# Patient Record
Sex: FEMALE | Race: BLACK OR AFRICAN AMERICAN | URBAN - METROPOLITAN AREA
[De-identification: names, ages, dates, MRNs, and addresses within clinical notes are randomized per-mention and may not be internally consistent; named-entity substitution may affect disease eponyms.]

---

## 2017-12-01 ENCOUNTER — EMERGENCY (EMERGENCY)
Facility: HOSPITAL | Age: 52
LOS: 1 days | Discharge: HOME | End: 2017-12-01
Attending: EMERGENCY MEDICINE | Admitting: EMERGENCY MEDICINE
Payer: SELF-PAY

## 2017-12-01 VITALS
HEART RATE: 72 BPM | TEMPERATURE: 98 F | RESPIRATION RATE: 16 BRPM | DIASTOLIC BLOOD PRESSURE: 81 MMHG | SYSTOLIC BLOOD PRESSURE: 125 MMHG | OXYGEN SATURATION: 100 %

## 2017-12-01 LAB
BASOPHILS # BLD AUTO: 0.01 K/UL — SIGNIFICANT CHANGE UP (ref 0–0.2)
BASOPHILS NFR BLD AUTO: 0.2 % — SIGNIFICANT CHANGE UP (ref 0–2)
EOSINOPHIL # BLD AUTO: 0.04 K/UL — SIGNIFICANT CHANGE UP (ref 0–0.5)
EOSINOPHIL NFR BLD AUTO: 0.9 % — SIGNIFICANT CHANGE UP (ref 0–6)
HCT VFR BLD CALC: 36.7 % — SIGNIFICANT CHANGE UP (ref 34.5–45)
HGB BLD-MCNC: 12.2 G/DL — SIGNIFICANT CHANGE UP (ref 11.5–15.5)
IMM GRANULOCYTES # BLD AUTO: 0.01 # — SIGNIFICANT CHANGE UP
IMM GRANULOCYTES NFR BLD AUTO: 0.2 % — SIGNIFICANT CHANGE UP (ref 0–1.5)
LYMPHOCYTES # BLD AUTO: 1.6 K/UL — SIGNIFICANT CHANGE UP (ref 1–3.3)
LYMPHOCYTES # BLD AUTO: 35.9 % — SIGNIFICANT CHANGE UP (ref 13–44)
MCHC RBC-ENTMCNC: 29.7 PG — SIGNIFICANT CHANGE UP (ref 27–34)
MCHC RBC-ENTMCNC: 33.2 % — SIGNIFICANT CHANGE UP (ref 32–36)
MCV RBC AUTO: 89.3 FL — SIGNIFICANT CHANGE UP (ref 80–100)
MONOCYTES # BLD AUTO: 0.35 K/UL — SIGNIFICANT CHANGE UP (ref 0–0.9)
MONOCYTES NFR BLD AUTO: 7.8 % — SIGNIFICANT CHANGE UP (ref 2–14)
NEUTROPHILS # BLD AUTO: 2.45 K/UL — SIGNIFICANT CHANGE UP (ref 1.8–7.4)
NEUTROPHILS NFR BLD AUTO: 55 % — SIGNIFICANT CHANGE UP (ref 43–77)
NRBC # FLD: 0 — SIGNIFICANT CHANGE UP
PLATELET # BLD AUTO: 265 K/UL — SIGNIFICANT CHANGE UP (ref 150–400)
PMV BLD: 10.9 FL — SIGNIFICANT CHANGE UP (ref 7–13)
RBC # BLD: 4.11 M/UL — SIGNIFICANT CHANGE UP (ref 3.8–5.2)
RBC # FLD: 12.2 % — SIGNIFICANT CHANGE UP (ref 10.3–14.5)
WBC # BLD: 4.46 K/UL — SIGNIFICANT CHANGE UP (ref 3.8–10.5)
WBC # FLD AUTO: 4.46 K/UL — SIGNIFICANT CHANGE UP (ref 3.8–10.5)

## 2017-12-01 PROCEDURE — 99285 EMERGENCY DEPT VISIT HI MDM: CPT | Mod: 25

## 2017-12-01 PROCEDURE — 93010 ELECTROCARDIOGRAM REPORT: CPT

## 2017-12-01 PROCEDURE — 71020: CPT | Mod: 26

## 2017-12-01 RX ORDER — SODIUM CHLORIDE 9 MG/ML
1000 INJECTION INTRAMUSCULAR; INTRAVENOUS; SUBCUTANEOUS ONCE
Qty: 0 | Refills: 0 | Status: COMPLETED | OUTPATIENT
Start: 2017-12-01 | End: 2017-12-01

## 2017-12-01 NOTE — ED ADULT TRIAGE NOTE - CHIEF COMPLAINT QUOTE
Pt c/o dizziness x 1hr. Denies LOC. Family states near syncope/incoherent. Also c/o LUE pain x 1 wk - denies falls/trauma. Denies all other adverse symptoms at this time. Pt c/o dizziness x 1hr. Denies LOC. Family states near syncope/incoherent. Also c/o LUE pain x 1 wk - denies falls/trauma. Denies all other adverse symptoms at this time.  Addendum: Pt MARK up triage due to "abnormal EKG" signed by MD.

## 2017-12-01 NOTE — ED PROVIDER NOTE - PROGRESS NOTE DETAILS
David WRIGHT: I received sign out on this patient.  CE neg x 2, pt resting comfortably, feeling better.  Pt is stable for discharge home, close PMD follow-up.

## 2017-12-01 NOTE — ED PROVIDER NOTE - ATTENDING CONTRIBUTION TO CARE
52F o/w healthy presents with dizziness.Reports she was out to dinner w family started to feel dizziness, described as lightheadedness.No syncope. States she had an interview today which was stressful, but hadn’t eaten all day. No CP/SOB.  No leg pain or swelling.  No prior h/o DVT. No HA. On exam awake & alert, NAD, NC/AT,mmm, lungs CTAB, RRR, abdomen soft NT/ND, no edema, 2+ pulses b/l, neuro A&Ox3, no focal deficits, skin warm and dry, no rash.  EKG with biphasic TW V3 o/w no ST changes, no arrhythmia.  Plan for basic labs, serial trops. Hx c/w vasovagal symptoms.

## 2017-12-01 NOTE — ED ADULT NURSE NOTE - CHIEF COMPLAINT QUOTE
Pt c/o dizziness x 1hr. Denies LOC. Family states near syncope/incoherent. Also c/o LUE pain x 1 wk - denies falls/trauma. Denies all other adverse symptoms at this time.  Addendum: Pt MARK up triage due to "abnormal EKG" signed by MD.

## 2017-12-01 NOTE — ED PROVIDER NOTE - MEDICAL DECISION MAKING DETAILS
52F with presyncope. biphasic t wave in v3 on ekg but no CP or SOB. ACS/arrhythmia very unlikely but given abnormal ekg will get troponin x2. Presentation not consistent with PE (wells = 0). Plan: ekg, cxr, labs, trop x2, reassess. Likely d/c with close outpt cardiology follow up.

## 2017-12-01 NOTE — ED PROVIDER NOTE - OBJECTIVE STATEMENT
52F h/o migraines p/w lightheadedness and generalized weakness, onset earlier tonight, improved. Pt with upper back pain x 1 week ago, resolved 3 days ago. No HA, CP, SOB, palpitations, N/V. Pt didn't eat much today, reports she had a stressful interview today. 52F h/o migraines p/w lightheadedness and generalized weakness, onset earlier tonight, improved. Pt with upper back pain x 1 week ago, resolved 3 days ago (denies LUE pain). No HA, CP, SOB, palpitations, N/V. Pt didn't eat much today, reports she had a stressful interview today. Pt is post-menopausal, no black/bloody stool. No hx of DVT/PE/cancer, calf pain/swelling, hemoptysis, or recent trauma/surgery/immobilization.

## 2017-12-01 NOTE — ED ADULT NURSE NOTE - OBJECTIVE STATEMENT
52 year old female, A&Ox3, hx of PFO; c/o dizziness and possible syncope tonight. Per family pt. was sitting down in a restaurant after eating, stated that she "didn't feel well" and suddenly slumped over, pt. was immediately arousable but incoherent and confused which lasted about 10 minutes. Per patient she felt "dizzy and faint" prior to slumping over. Pt. also admits to intermittent left upper back pain that radiates around to chest x 1 week. Reports she was on several flights over the past few weeks to the [facility] and [place]. Denies SOB, chest pain, fever, chills, cough, vomiting, or headache. Denies smoking or alcohol or drug use. Respirations even, unlabored. Sinus rhythm on CM. IV placed, labs sent. Report given to primary RN.

## 2017-12-02 VITALS
HEART RATE: 78 BPM | DIASTOLIC BLOOD PRESSURE: 81 MMHG | OXYGEN SATURATION: 100 % | SYSTOLIC BLOOD PRESSURE: 114 MMHG | RESPIRATION RATE: 16 BRPM

## 2017-12-02 LAB
ALBUMIN SERPL ELPH-MCNC: 3.9 G/DL — SIGNIFICANT CHANGE UP (ref 3.3–5)
ALP SERPL-CCNC: 67 U/L — SIGNIFICANT CHANGE UP (ref 40–120)
ALT FLD-CCNC: 13 U/L — SIGNIFICANT CHANGE UP (ref 4–33)
APPEARANCE UR: CLEAR — SIGNIFICANT CHANGE UP
AST SERPL-CCNC: 18 U/L — SIGNIFICANT CHANGE UP (ref 4–32)
BACTERIA # UR AUTO: SIGNIFICANT CHANGE UP
BILIRUB SERPL-MCNC: 0.2 MG/DL — SIGNIFICANT CHANGE UP (ref 0.2–1.2)
BILIRUB UR-MCNC: NEGATIVE — SIGNIFICANT CHANGE UP
BLOOD UR QL VISUAL: NEGATIVE — SIGNIFICANT CHANGE UP
BUN SERPL-MCNC: 13 MG/DL — SIGNIFICANT CHANGE UP (ref 7–23)
CALCIUM SERPL-MCNC: 9.1 MG/DL — SIGNIFICANT CHANGE UP (ref 8.4–10.5)
CHLORIDE SERPL-SCNC: 104 MMOL/L — SIGNIFICANT CHANGE UP (ref 98–107)
CK MB BLD-MCNC: 0.7 — SIGNIFICANT CHANGE UP (ref 0–2.5)
CK MB BLD-MCNC: 1 NG/ML — SIGNIFICANT CHANGE UP (ref 1–4.7)
CK MB BLD-MCNC: 1.11 NG/ML — SIGNIFICANT CHANGE UP (ref 1–4.7)
CK MB BLD-MCNC: SIGNIFICANT CHANGE UP (ref 0–2.5)
CK SERPL-CCNC: 147 U/L — SIGNIFICANT CHANGE UP (ref 25–170)
CK SERPL-CCNC: 155 U/L — SIGNIFICANT CHANGE UP (ref 25–170)
CO2 SERPL-SCNC: 26 MMOL/L — SIGNIFICANT CHANGE UP (ref 22–31)
COLOR SPEC: YELLOW — SIGNIFICANT CHANGE UP
CREAT SERPL-MCNC: 0.92 MG/DL — SIGNIFICANT CHANGE UP (ref 0.5–1.3)
GLUCOSE SERPL-MCNC: 117 MG/DL — HIGH (ref 70–99)
GLUCOSE UR-MCNC: NEGATIVE — SIGNIFICANT CHANGE UP
HYALINE CASTS # UR AUTO: SIGNIFICANT CHANGE UP (ref 0–?)
KETONES UR-MCNC: NEGATIVE — SIGNIFICANT CHANGE UP
LEUKOCYTE ESTERASE UR-ACNC: NEGATIVE — SIGNIFICANT CHANGE UP
MUCOUS THREADS # UR AUTO: SIGNIFICANT CHANGE UP
NITRITE UR-MCNC: NEGATIVE — SIGNIFICANT CHANGE UP
PH UR: 6 — SIGNIFICANT CHANGE UP (ref 4.6–8)
POTASSIUM SERPL-MCNC: 3.9 MMOL/L — SIGNIFICANT CHANGE UP (ref 3.5–5.3)
POTASSIUM SERPL-SCNC: 3.9 MMOL/L — SIGNIFICANT CHANGE UP (ref 3.5–5.3)
PROT SERPL-MCNC: 6.9 G/DL — SIGNIFICANT CHANGE UP (ref 6–8.3)
PROT UR-MCNC: NEGATIVE — SIGNIFICANT CHANGE UP
RBC CASTS # UR COMP ASSIST: SIGNIFICANT CHANGE UP (ref 0–?)
SODIUM SERPL-SCNC: 142 MMOL/L — SIGNIFICANT CHANGE UP (ref 135–145)
SP GR SPEC: 1.01 — SIGNIFICANT CHANGE UP (ref 1–1.03)
SQUAMOUS # UR AUTO: SIGNIFICANT CHANGE UP
TROPONIN T SERPL-MCNC: < 0.06 NG/ML — SIGNIFICANT CHANGE UP (ref 0–0.06)
TROPONIN T SERPL-MCNC: < 0.06 NG/ML — SIGNIFICANT CHANGE UP (ref 0–0.06)
UROBILINOGEN FLD QL: NORMAL E.U. — SIGNIFICANT CHANGE UP (ref 0.1–0.2)
WBC UR QL: HIGH (ref 0–?)

## 2017-12-02 RX ADMIN — SODIUM CHLORIDE 1000 MILLILITER(S): 9 INJECTION INTRAMUSCULAR; INTRAVENOUS; SUBCUTANEOUS at 00:11

## 2017-12-02 NOTE — ED ADULT NURSE REASSESSMENT NOTE - NS ED NURSE REASSESS COMMENT FT1
Pt HR NSR on CM, resps even unlabored on RA, appearing in no distress. 2nd CE resulted negative.  ED MD Hernandez @ bedside to review dc instructions and pcp follow up.  IV access dc'd per routine at time of DC by ED MD Hernandez. Stable at time of exit.

## 2018-11-20 NOTE — ED ADULT TRIAGE NOTE - NS ED TRIAGE CLINICAL UPGRADE PROVIDER FT
abnormal EKG per MD
no dermatitis, no environmental allergies, no food allergies, no immunosuppressive disorder, and no pruritus.

## 2020-05-02 NOTE — ED ADULT NURSE NOTE - PAIN: BODY LOCATION
Left:/radiates to chest/scapula
I have personally provided the amount of critical care time documented below excluding time spent on separate procedures
